# Patient Record
Sex: FEMALE | Race: WHITE | URBAN - METROPOLITAN AREA
[De-identification: names, ages, dates, MRNs, and addresses within clinical notes are randomized per-mention and may not be internally consistent; named-entity substitution may affect disease eponyms.]

---

## 2017-05-16 ENCOUNTER — OFFICE VISIT (OUTPATIENT)
Dept: SLEEP MEDICINE | Facility: CLINIC | Age: 27
End: 2017-05-16
Payer: COMMERCIAL

## 2017-05-16 VITALS
DIASTOLIC BLOOD PRESSURE: 79 MMHG | BODY MASS INDEX: 22.63 KG/M2 | TEMPERATURE: 98 F | HEART RATE: 74 BPM | WEIGHT: 144.2 LBS | RESPIRATION RATE: 16 BRPM | OXYGEN SATURATION: 98 % | HEIGHT: 67 IN | SYSTOLIC BLOOD PRESSURE: 117 MMHG

## 2017-05-16 DIAGNOSIS — G47.9 SLEEP DISTURBANCE: ICD-10-CM

## 2017-05-16 DIAGNOSIS — F51.5 NIGHTMARE: ICD-10-CM

## 2017-05-16 DIAGNOSIS — R06.83 SNORING: ICD-10-CM

## 2017-05-16 DIAGNOSIS — G47.19 EXCESSIVE DAYTIME SLEEPINESS: Primary | ICD-10-CM

## 2017-05-16 PROCEDURE — 99204 OFFICE O/P NEW MOD 45 MIN: CPT | Performed by: INTERNAL MEDICINE

## 2017-05-16 NOTE — NURSING NOTE
"Chief Complaint   Patient presents with     Sleep Problem     fatigue, daytime napping       Initial /79  Pulse 74  Temp 98  F (36.7  C) (Oral)  Resp 16  Ht 1.702 m (5' 7\")  Wt 65.4 kg (144 lb 3.2 oz)  SpO2 98%  BMI 22.58 kg/m2 Estimated body mass index is 22.58 kg/(m^2) as calculated from the following:    Height as of this encounter: 1.702 m (5' 7\").    Weight as of this encounter: 65.4 kg (144 lb 3.2 oz).  Medication Reconciliation: complete     ESS 15  Neck 30    Rubi Johnson  Clinical Coordinator    "

## 2017-05-16 NOTE — MR AVS SNAPSHOT
After Visit Summary   5/16/2017    Ebony Kay    MRN: [id number]           Patient Information     Date Of Birth          1990        Visit Information        Provider Department      5/16/2017 1:00 PM Oscar Martin MD [facility]        Today's Diagnoses     Excessive daytime sleepiness    -  1    Snoring        Nightmare        Sleep disturbance           Follow-ups after your visit        Your next 10 appointments already scheduled     Jun 21, 2017  8:30 PM CDT   PSG Split with BED 4 SH SLEEP   [facility] ([facility])    [address]  [place] [address]   [phone]            Jun 29, 2017  2:00 PM CDT   Return Sleep Patient with Oscar Martin MD   [facility] ([facility])    [address]  [place] [address]   [phone]              Future tests that were ordered for you today     Open Future Orders        Priority Expected Expires Ordered    Comprehensive Sleep Study Routine  11/12/2017 5/16/2017            Who to contact     If you have questions or need follow up information about today's clinic visit or your schedule please contact [facility] directly at [phone].  Normal or non-critical lab and imaging results will be communicated to you by FanBoomhart, letter or phone within 4 business days after the clinic has received the results. If you do not hear from us within 7 days, please contact the clinic through FanBoomhart or phone. If you have a critical or abnormal lab result, we will notify you by phone as soon as possible.  Submit refill requests through Scalado or call your pharmacy and they will forward the refill request to us. Please allow 3 business days for your refill to be completed.          Additional Information About Your Visit        Scalado Information     Scalado lets you send messages to your doctor, view  "your test results, renew your prescriptions, schedule appointments and more. To sign up, go to [email or web address] . Click on \"Log in\" on the left side of the screen, which will take you to the Welcome page. Then click on \"Sign up Now\" on the right side of the page.     You will be asked to enter the access code listed below, as well as some personal information. Please follow the directions to create your username and password.     Your access code is: [email or web address]  Expires: 2017  2:05 PM     Your access code will  in 90 days. If you need help or a new code, please call your [place] clinic or [phone].        Care EveryWhere ID     This is your Care EveryWhere ID. This could be used by other organizations to access your [place] medical records  [id number]         Blood Pressure from Last 3 Encounters:   No data found for BP    Weight from Last 3 Encounters:   No data found for Wt               Primary Care Provider    None Specified       No primary provider on file.        Thank you!     Thank you for choosing [facility]  for your care. Our goal is always to provide you with excellent care. Hearing back from our patients is one way we can continue to improve our services. Please take a few minutes to complete the written survey that you may receive in the mail after your visit with us. Thank you!             Your Updated Medication List - Protect others around you: Learn how to safely use, store and throw away your medicines at www.disposemymeds.org.          This list is accurate as of: 17  2:05 PM.  Always use your most recent med list.                   Brand Name Dispense Instructions for use    LEXAPRO PO      Take 10 mg by mouth daily       XYZAL PO      Take 1 tablet by mouth daily         "

## 2017-05-16 NOTE — PATIENT INSTRUCTIONS

## 2017-05-17 NOTE — PROGRESS NOTES
SLEEP EVALUATION       DATE OF SERVICE:  05/16/2017      CHIEF COMPLAINT:  Fatigue.      HISTORY OF PRESENT ILLNESS:  Ms. Ebony Kay is a 27-year-old female who presents with a chief complaint of excessive fatigue and long sleep hours.  The patient started experiencing these symptoms around age 15.  The onset of her fatigue coincided with being diagnosed with depression.  She has been on antidepressant therapy since age 14.  She takes escitalopram at a dose of 15 mg daily.  Her psychiatrist is Dr. Harriett Cruz at [facility].  One month ago, the patient started aripiprazole as an augmentation agent at a dose of 2 mg daily.      The patient reports feeling tired despite averaging 8 hours of sleep in the night.  She would sometimes take a 4-hour nap in the daytime, which is refreshing for her.  She goes to bed usually at 10:00 p.m.  She has difficulty falling asleep at this time and will read a book or read on her iPad.  She is usually able to fall asleep by midnight.  Once asleep, she does not have any awakenings.  She wakes up for the morning around 8-9 a.m.  The patient is reported to be a restless sleeper with frequent thrashing in her bed.  Her bed covers are messed up by the morning.  She wakes up tired in the morning.  She has a headache 2-3 times in a week.  She rates her [place] Sleepiness Scale Score at 15/24.  She denies inadvertent napping, but can easily fall asleep during sedentary situations.  On long drives, she has experienced drowsiness and caught herself drifting off her faisal.  She denies any history of motor vehicle accidents.      The patient has experienced vivid dreams since age 5 or 6.  She reports that she would have such vivid dreams they are difficult to separate from reality.  These are not reported to be threatening and she denies any clear history of dream enactment.  There is no history of falling out of bed, kicking or sleep walking.  She does not correlate her thrashing with  dream content.  She has not injured herself or others with sleep-related behaviors.      The patient denies having cataplexy.  She also denies sleep paralysis.  She may have experienced a rare episode of hypnagogic hallucination.      She sleeps alone.  She thinks that there is occasional snoring which has been reported to her by a friend.  She denies any history of snort arousals, gasping or choking episodes in her sleep.  She has no knowledge of witnessed apneas.      The patient's depressive episodes characterized by a significant anhedonia, poor motivation and fatigue.  Her medical history is otherwise significant for hypothyroidism for which she is on levothyroxine.  She has chronic seasonal allergies and takes an antihistamine at night.      PAST MEDICAL HISTORY:   1.  Major depressive disorder, recurrent.   2.  Hypothyroidism.      FAMILY HISTORY:  Her father has snoring.  There is no family history of hypersomnia or other sleep disorders.      SOCIAL HISTORY:  She currently works part-time.  There is no history of smoking.  She rarely drinks alcohol.  She does not drink coffee or caffeinated drinks on a regular basis.      REVIEW OF SYSTEMS:  A 10-point review of systems was negative except for sore throat, sinus pain, postnasal drip and runny nose, shortness of breath with activity, cough, diarrhea.      PHYSICAL EXAMINATION:   CONSTITUTIONAL:  Awake, alert, cooperative, in no apparent distress.    Eyes:  No icterus.  Normal conjunctivae, PERRL.    Head: Normocephalic. No masses, lesions, tenderness or abnormalities  Neck: Neck supple. No adenopathy. Thyroid symmetric, normal size,  ENT: ENT exam normal, no neck nodes or sinus tenderness,  Oropharynx is Rahman class IV with narrow diameters.   CARDIOVASCULAR:  Regular S1 and S2.   PULMONARY:  Chest is symmetric.  Lungs clear bilaterally.   Musculoskeletal: extremities normal- no gross deformities noted, gait normal and normal muscle tone  NEUROLOGIC:   Alert and oriented x3, no focal neurological deficit.  Cranial nerves are grossly normal.     PSYCHIATRIC:  Mood is euthymic with a congruent affect.  Attention and concentration are normal.      ASSESSMENT:   1.  Excessive daytime sleepiness.   2.  Snoring.   3.  Nightmares.      The patient presents with a history of nonrestorative sleep and daytime fatigue/sleepiness.  Despite 8 hours of nocturnal sleep, she will still have to take a 4-hour nap in the day.  There is no history of cataplexy.      There is a history of occasional snoring but other characteristic symptoms of sleep disordered breathing are not evident.  The patient has a history of vivid lucid dreams, nightmares and thrashing in her sleep.  History is negative for restless legs syndrome.      Etiology of her daytime fatigue and sleepiness is not clear.  A central disorder of hypersomnia is a possibility but symptoms are not characteristic of a disorder of hypocretin deficiency.  She could have idiopathic hypersomnia or her sleepiness is a sequelae of her depression.  A full evaluation for a central disorder of hypersomnia would include a 2-week actigraphy monitoring followed by overnight polysomnogram and a daytime multiple sleep latency test to objectively assess excessive sleepiness.  However, in order for the results to be properly interpreted, she will have to discontinue REM suppressing medications, mainly antidepressants.  She is not in a clinical position to do that as tapering or discontinuing her antidepressants will precipitate a relapse of her depression.  I would defer to her psychiatrist if a clinical trial of stimulants could help her sleepiness.      PLAN:  After discussion, we agreed to perform an overnight polysomnogram to assess if there are any sleep fragmenting conditions that could explain her poor quality sleep and daytime sleepiness.      TREATMENT PLAN:   1.  Split night polysomnogram.   2.  Follow up after sleep study for  further recommendations.       I spent a total 45 minutes with patient with more than 50% in counseling     CHELO MENDES MD             D: 2017 14:14   T: 2017 23:09   MT: MARINO      Name:     IMELDA ACUÑA   MRN:      [id number]        Account:      [id number]   :      1990           Visit Date:   2017      Document: [id number]

## 2020-08-07 ENCOUNTER — APPOINTMENT (OUTPATIENT)
Dept: URBAN - METROPOLITAN AREA CLINIC 259 | Age: 30
Setting detail: DERMATOLOGY
End: 2020-08-09

## 2020-08-07 DIAGNOSIS — L29.8 OTHER PRURITUS: ICD-10-CM

## 2020-08-07 DIAGNOSIS — L23.7 ALLERGIC CONTACT DERMATITIS DUE TO PLANTS, EXCEPT FOOD: ICD-10-CM

## 2020-08-07 PROCEDURE — 99203 OFFICE O/P NEW LOW 30 MIN: CPT

## 2020-08-07 PROCEDURE — OTHER COUNSELING: OTHER

## 2020-08-07 PROCEDURE — OTHER PRESCRIPTION: OTHER

## 2020-08-07 RX ORDER — CLOBETASOL PROPIONATE 0.5 MG/G
OINTMENT TOPICAL
Qty: 1 | Refills: 2 | Status: ERX | COMMUNITY
Start: 2020-08-07

## 2020-08-07 RX ORDER — PREDNISONE 10 MG/1
TABLET ORAL
Qty: 45 | Refills: 0 | Status: ERX | COMMUNITY
Start: 2020-08-07

## 2020-08-07 ASSESSMENT — LOCATION SIMPLE DESCRIPTION DERM
LOCATION SIMPLE: RIGHT PRETIBIAL REGION
LOCATION SIMPLE: LEFT PRETIBIAL REGION

## 2020-08-07 ASSESSMENT — LOCATION DETAILED DESCRIPTION DERM
LOCATION DETAILED: LEFT PROXIMAL PRETIBIAL REGION
LOCATION DETAILED: RIGHT PROXIMAL PRETIBIAL REGION
LOCATION DETAILED: LEFT DISTAL PRETIBIAL REGION

## 2020-08-07 ASSESSMENT — LOCATION ZONE DERM: LOCATION ZONE: LEG

## 2020-08-07 NOTE — HPI: RASH
What Type Of Note Output Would You Prefer (Optional)?: Standard Output
Is The Patient Presenting As Previously Scheduled?: No, they are a work-in
How Severe Is Your Rash?: moderate
Is This A New Presentation, Or A Follow-Up?: Rash
Additional History: Patient went to urgent care on Tuesday where they gave her an oral antibiotic that she only took for two days and stopped. She went into her PCP yesterday where they gave her Triamcinolone and mupirocin for topical treatment. She feels like this is spreading and would like this checked.